# Patient Record
Sex: MALE | Race: BLACK OR AFRICAN AMERICAN | NOT HISPANIC OR LATINO | URBAN - METROPOLITAN AREA
[De-identification: names, ages, dates, MRNs, and addresses within clinical notes are randomized per-mention and may not be internally consistent; named-entity substitution may affect disease eponyms.]

---

## 2020-01-20 ENCOUNTER — INPATIENT (INPATIENT)
Facility: HOSPITAL | Age: 60
LOS: 1 days | Discharge: HOME | End: 2020-01-22
Attending: INTERNAL MEDICINE | Admitting: INTERNAL MEDICINE
Payer: MEDICAID

## 2020-01-20 VITALS
OXYGEN SATURATION: 98 % | TEMPERATURE: 98 F | SYSTOLIC BLOOD PRESSURE: 151 MMHG | DIASTOLIC BLOOD PRESSURE: 80 MMHG | HEART RATE: 54 BPM | RESPIRATION RATE: 17 BRPM

## 2020-01-20 DIAGNOSIS — I24.9 ACUTE ISCHEMIC HEART DISEASE, UNSPECIFIED: ICD-10-CM

## 2020-01-20 DIAGNOSIS — R09.89 OTHER SPECIFIED SYMPTOMS AND SIGNS INVOLVING THE CIRCULATORY AND RESPIRATORY SYSTEMS: ICD-10-CM

## 2020-01-20 DIAGNOSIS — I20.8 OTHER FORMS OF ANGINA PECTORIS: ICD-10-CM

## 2020-01-20 DIAGNOSIS — I10 ESSENTIAL (PRIMARY) HYPERTENSION: ICD-10-CM

## 2020-01-20 DIAGNOSIS — R00.1 BRADYCARDIA, UNSPECIFIED: ICD-10-CM

## 2020-01-20 DIAGNOSIS — Z90.49 ACQUIRED ABSENCE OF OTHER SPECIFIED PARTS OF DIGESTIVE TRACT: Chronic | ICD-10-CM

## 2020-01-20 DIAGNOSIS — M79.604 PAIN IN RIGHT LEG: ICD-10-CM

## 2020-01-20 DIAGNOSIS — Z29.9 ENCOUNTER FOR PROPHYLACTIC MEASURES, UNSPECIFIED: ICD-10-CM

## 2020-01-20 LAB
ALBUMIN SERPL ELPH-MCNC: 4.7 G/DL — SIGNIFICANT CHANGE UP (ref 3.3–5)
ALP SERPL-CCNC: 57 U/L — SIGNIFICANT CHANGE UP (ref 40–120)
ALT FLD-CCNC: 29 U/L — SIGNIFICANT CHANGE UP (ref 4–41)
AMPHET UR-MCNC: NEGATIVE — SIGNIFICANT CHANGE UP
ANION GAP SERPL CALC-SCNC: 13 MMO/L — SIGNIFICANT CHANGE UP (ref 7–14)
APAP SERPL-MCNC: < 15 UG/ML — LOW (ref 15–25)
APTT BLD: 28.1 SEC — SIGNIFICANT CHANGE UP (ref 27.5–36.3)
AST SERPL-CCNC: 19 U/L — SIGNIFICANT CHANGE UP (ref 4–40)
BARBITURATES UR SCN-MCNC: NEGATIVE — SIGNIFICANT CHANGE UP
BASOPHILS # BLD AUTO: 0.06 K/UL — SIGNIFICANT CHANGE UP (ref 0–0.2)
BASOPHILS NFR BLD AUTO: 1 % — SIGNIFICANT CHANGE UP (ref 0–2)
BENZODIAZ UR-MCNC: NEGATIVE — SIGNIFICANT CHANGE UP
BILIRUB SERPL-MCNC: 0.8 MG/DL — SIGNIFICANT CHANGE UP (ref 0.2–1.2)
BUN SERPL-MCNC: 11 MG/DL — SIGNIFICANT CHANGE UP (ref 7–23)
CALCIUM SERPL-MCNC: 9.2 MG/DL — SIGNIFICANT CHANGE UP (ref 8.4–10.5)
CANNABINOIDS UR-MCNC: NEGATIVE — SIGNIFICANT CHANGE UP
CHLORIDE SERPL-SCNC: 103 MMOL/L — SIGNIFICANT CHANGE UP (ref 98–107)
CK MB BLD-MCNC: 2.17 NG/ML — SIGNIFICANT CHANGE UP (ref 1–6.6)
CK SERPL-CCNC: 129 U/L — SIGNIFICANT CHANGE UP (ref 30–200)
CO2 SERPL-SCNC: 23 MMOL/L — SIGNIFICANT CHANGE UP (ref 22–31)
COCAINE METAB.OTHER UR-MCNC: NEGATIVE — SIGNIFICANT CHANGE UP
CREAT SERPL-MCNC: 1.05 MG/DL — SIGNIFICANT CHANGE UP (ref 0.5–1.3)
EOSINOPHIL # BLD AUTO: 0.14 K/UL — SIGNIFICANT CHANGE UP (ref 0–0.5)
EOSINOPHIL NFR BLD AUTO: 2.4 % — SIGNIFICANT CHANGE UP (ref 0–6)
ETHANOL BLD-MCNC: < 10 MG/DL — SIGNIFICANT CHANGE UP
GLUCOSE SERPL-MCNC: 99 MG/DL — SIGNIFICANT CHANGE UP (ref 70–99)
HCT VFR BLD CALC: 43.8 % — SIGNIFICANT CHANGE UP (ref 39–50)
HGB BLD-MCNC: 14.7 G/DL — SIGNIFICANT CHANGE UP (ref 13–17)
IMM GRANULOCYTES NFR BLD AUTO: 0.2 % — SIGNIFICANT CHANGE UP (ref 0–1.5)
INR BLD: 1.04 — SIGNIFICANT CHANGE UP (ref 0.88–1.17)
LYMPHOCYTES # BLD AUTO: 2.35 K/UL — SIGNIFICANT CHANGE UP (ref 1–3.3)
LYMPHOCYTES # BLD AUTO: 40.6 % — SIGNIFICANT CHANGE UP (ref 13–44)
MCHC RBC-ENTMCNC: 28.7 PG — SIGNIFICANT CHANGE UP (ref 27–34)
MCHC RBC-ENTMCNC: 33.6 % — SIGNIFICANT CHANGE UP (ref 32–36)
MCV RBC AUTO: 85.5 FL — SIGNIFICANT CHANGE UP (ref 80–100)
METHADONE UR-MCNC: NEGATIVE — SIGNIFICANT CHANGE UP
MONOCYTES # BLD AUTO: 0.83 K/UL — SIGNIFICANT CHANGE UP (ref 0–0.9)
MONOCYTES NFR BLD AUTO: 14.3 % — HIGH (ref 2–14)
NEUTROPHILS # BLD AUTO: 2.4 K/UL — SIGNIFICANT CHANGE UP (ref 1.8–7.4)
NEUTROPHILS NFR BLD AUTO: 41.5 % — LOW (ref 43–77)
NRBC # FLD: 0 K/UL — SIGNIFICANT CHANGE UP (ref 0–0)
OPIATES UR-MCNC: NEGATIVE — SIGNIFICANT CHANGE UP
OXYCODONE UR-MCNC: NEGATIVE — SIGNIFICANT CHANGE UP
PCP UR-MCNC: NEGATIVE — SIGNIFICANT CHANGE UP
PLATELET # BLD AUTO: 252 K/UL — SIGNIFICANT CHANGE UP (ref 150–400)
PMV BLD: 11 FL — SIGNIFICANT CHANGE UP (ref 7–13)
POTASSIUM SERPL-MCNC: 4.2 MMOL/L — SIGNIFICANT CHANGE UP (ref 3.5–5.3)
POTASSIUM SERPL-SCNC: 4.2 MMOL/L — SIGNIFICANT CHANGE UP (ref 3.5–5.3)
PROT SERPL-MCNC: 7.5 G/DL — SIGNIFICANT CHANGE UP (ref 6–8.3)
PROTHROM AB SERPL-ACNC: 11.6 SEC — SIGNIFICANT CHANGE UP (ref 9.8–13.1)
RBC # BLD: 5.12 M/UL — SIGNIFICANT CHANGE UP (ref 4.2–5.8)
RBC # FLD: 11.9 % — SIGNIFICANT CHANGE UP (ref 10.3–14.5)
SALICYLATES SERPL-MCNC: < 5 MG/DL — LOW (ref 15–30)
SODIUM SERPL-SCNC: 139 MMOL/L — SIGNIFICANT CHANGE UP (ref 135–145)
TROPONIN T, HIGH SENSITIVITY: 11 NG/L — SIGNIFICANT CHANGE UP (ref ?–14)
TROPONIN T, HIGH SENSITIVITY: 11 NG/L — SIGNIFICANT CHANGE UP (ref ?–14)
TROPONIN T, HIGH SENSITIVITY: 15 NG/L — SIGNIFICANT CHANGE UP (ref ?–14)
WBC # BLD: 5.79 K/UL — SIGNIFICANT CHANGE UP (ref 3.8–10.5)
WBC # FLD AUTO: 5.79 K/UL — SIGNIFICANT CHANGE UP (ref 3.8–10.5)

## 2020-01-20 PROCEDURE — 93971 EXTREMITY STUDY: CPT | Mod: 26,LT

## 2020-01-20 PROCEDURE — 71046 X-RAY EXAM CHEST 2 VIEWS: CPT | Mod: 26

## 2020-01-20 RX ORDER — ATORVASTATIN CALCIUM 80 MG/1
80 TABLET, FILM COATED ORAL AT BEDTIME
Refills: 0 | Status: DISCONTINUED | OUTPATIENT
Start: 2020-01-20 | End: 2020-01-22

## 2020-01-20 RX ORDER — INFLUENZA VIRUS VACCINE 15; 15; 15; 15 UG/.5ML; UG/.5ML; UG/.5ML; UG/.5ML
0.5 SUSPENSION INTRAMUSCULAR ONCE
Refills: 0 | Status: DISCONTINUED | OUTPATIENT
Start: 2020-01-20 | End: 2020-01-22

## 2020-01-20 RX ORDER — HEPARIN SODIUM 5000 [USP'U]/ML
5000 INJECTION INTRAVENOUS; SUBCUTANEOUS EVERY 12 HOURS
Refills: 0 | Status: DISCONTINUED | OUTPATIENT
Start: 2020-01-20 | End: 2020-01-22

## 2020-01-20 RX ORDER — TICAGRELOR 90 MG/1
180 TABLET ORAL ONCE
Refills: 0 | Status: COMPLETED | OUTPATIENT
Start: 2020-01-20 | End: 2020-01-20

## 2020-01-20 RX ORDER — AMLODIPINE BESYLATE 2.5 MG/1
1 TABLET ORAL
Qty: 0 | Refills: 0 | DISCHARGE

## 2020-01-20 RX ORDER — AMLODIPINE BESYLATE 2.5 MG/1
5 TABLET ORAL DAILY
Refills: 0 | Status: DISCONTINUED | OUTPATIENT
Start: 2020-01-21 | End: 2020-01-22

## 2020-01-20 RX ORDER — NITROGLYCERIN 6.5 MG
0.4 CAPSULE, EXTENDED RELEASE ORAL ONCE
Refills: 0 | Status: COMPLETED | OUTPATIENT
Start: 2020-01-20 | End: 2020-01-20

## 2020-01-20 RX ORDER — ASPIRIN/CALCIUM CARB/MAGNESIUM 324 MG
81 TABLET ORAL DAILY
Refills: 0 | Status: DISCONTINUED | OUTPATIENT
Start: 2020-01-20 | End: 2020-01-22

## 2020-01-20 RX ADMIN — HEPARIN SODIUM 5000 UNIT(S): 5000 INJECTION INTRAVENOUS; SUBCUTANEOUS at 19:30

## 2020-01-20 RX ADMIN — TICAGRELOR 180 MILLIGRAM(S): 90 TABLET ORAL at 21:10

## 2020-01-20 RX ADMIN — ATORVASTATIN CALCIUM 80 MILLIGRAM(S): 80 TABLET, FILM COATED ORAL at 22:45

## 2020-01-20 RX ADMIN — Medication 0.4 MILLIGRAM(S): at 12:48

## 2020-01-20 NOTE — H&P ADULT - NEGATIVE MUSCULOSKELETAL SYMPTOMS
no stiffness/no arthralgia/no arthritis/no joint swelling/no myalgia/no muscle cramps/no muscle weakness

## 2020-01-20 NOTE — H&P ADULT - NEGATIVE OPHTHALMOLOGIC SYMPTOMS
no blurred vision R/no discharge R/no blurred vision L/no discharge L/no diplopia/no photophobia/no lacrimation L/no lacrimation R

## 2020-01-20 NOTE — H&P ADULT - NEGATIVE NEUROLOGICAL SYMPTOMS
no transient paralysis/no syncope/no weakness/no vertigo/no generalized seizures/no loss of consciousness/no tremors/no paresthesias/no hemiparesis/no focal seizures/no loss of sensation/no difficulty walking/no headache/no confusion

## 2020-01-20 NOTE — H&P ADULT - PROBLEM SELECTOR PLAN 1
Patient s/p 3 Aspirin prior to coming to the ER and then received Brilinta 180mg and nitroglycerine. Patient currently chest pain free and EKG revealed sinus bradycardia at a rate of 44 with LAD, TWI V5-V6, biphasic in II, AVF   Will monitor on telemetry, serial EKG and Mervin prn for any more episodes of chest pain   HgbA1C, TSH, lipid profile, CBC, CMP, D-dimer ordered   TTE ordered  Will make NPO after midnight for LHC in am 1/21   Started on Aspirin 81mg, Lipitor 80mg. Held B-Blocker due to bradycardia

## 2020-01-20 NOTE — H&P ADULT - NEGATIVE GASTROINTESTINAL SYMPTOMS
no constipation/no hematochezia/no melena/no diarrhea/no nausea/no vomiting/no change in bowel habits/no abdominal pain

## 2020-01-20 NOTE — H&P ADULT - NSHPSOCIALHISTORY_GEN_ALL_CORE
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

## 2020-01-20 NOTE — ED PROVIDER NOTE - ATTENDING CONTRIBUTION TO CARE
Pt was seen and evaluated by me. Pt is a 60 y/o male with PMHx of HTN who presented to the ED for intermittent chest pain X 4 days. Pt states over the past 4 days having intermittent chest pain with associated SOB with minimal exertion that lasts a few minutes then resolved. Pt also notes back pain with movement, relieved with rest. Lungs CTA b/l. RRR. Abd soft, non-tender. No palpable bruits. + distal pulses b/l.

## 2020-01-20 NOTE — ED PROVIDER NOTE - PHYSICAL EXAMINATION
General: NAD, good hygiene, well developed  HENT: Atraumatic, EOMI, no conjunctivae injection, moist mucosa  Neck: normal ROM and trachea midline   Cardiovascular: RRR, S1&2, no M or R, radial pulses equal and b/l  Respiratory: CTABL, no wheezes or crackles, no decreased breath sounds  Abdominal: soft and non-tender non distended, neg for guarding, torres's sign, rovsing's sign, mcburney's sign, no CVA tenderness   Extremities: no edema of the legs/feet, DP/PT equal b/l  Skin: warm, well perfused  Neurologic: nonfocal, AAOx3  Psych: normal mood and affect

## 2020-01-20 NOTE — ED ADULT TRIAGE NOTE - NSTRIAGECARE_GEN_A_ER
Quality 226: Preventive Care And Screening: Tobacco Use: Screening And Cessation Intervention: Patient screened for tobacco use and is an ex/non-smoker
Detail Level: Zone
EKG
Quality 431: Preventive Care And Screening: Unhealthy Alcohol Use - Screening: Patient screened for unhealthy alcohol use using a single question and scores less than 2 times per year

## 2020-01-20 NOTE — H&P ADULT - NEGATIVE ENMT SYMPTOMS
no tinnitus/no hearing difficulty/no sinus symptoms/no nasal discharge/no vertigo/no nasal congestion/no ear pain

## 2020-01-20 NOTE — H&P ADULT - ASSESSMENT
58 y/o M with PMH of HTN presented with the complaint of left sided chest pain. R/o ACS    +Chest pain-S/p Brilinta load and NPO for LHC in am

## 2020-01-20 NOTE — H&P ADULT - PROBLEM SELECTOR PLAN 2
Patient noted to be bradycardic on telemonitoring and EKG showed sinus bradycardia/marked bradycardia  Will monitor on telemetry for now   TTE ordered   TSH level ordered   Will hold all AVN blockers for now

## 2020-01-20 NOTE — H&P ADULT - NSICDXFAMILYHX_GEN_ALL_CORE_FT
FAMILY HISTORY:  Family history of bone cancer, Mother  Family history of hypertension, Father  FH: brain aneurysm, Sister

## 2020-01-20 NOTE — ED PROVIDER NOTE - NS ED ROS FT
GENERAL: No fever or chills, weight changes, nightsweats  EYES: no change in vision  HEENT: no dysplasia, odynophagia, ear pain, rhinorrhea, epistasis   CARDIAC: HPI    PULMONARY: cough no SOB  GI: no abdominal pain, no nausea or no vomiting, no diarrhea or constipation  : No changes in urination for pain/freq.   SKIN: no rashes, abnormal bruising or bleeding  NEURO: no headache, numbness/tingling, extremity weakness   MSK: No joint pain GENERAL: No fever or chills, weight changes, nightsweats  EYES: no change in vision  HEENT: no dysplasia, odynophagia, ear pain, rhinorrhea, epistasis   CARDIAC: + chest pain  PULMONARY: cough no SOB  GI: no abdominal pain, no nausea or no vomiting, no diarrhea or constipation  : No changes in urination for pain/freq.   SKIN: no rashes, abnormal bruising or bleeding  NEURO: no headache, numbness/tingling, extremity weakness   MSK: No joint pain

## 2020-01-20 NOTE — ED ADULT NURSE NOTE - OBJECTIVE STATEMENT
Pt arrives to spot 28 --pt c/o chest discomfort--7/10. Pt given NTG sl with decrease of pain to 2/10. PT REMAINS ON CARDIAC MONITOR IN BRADYCARDIC RHYTUM 42-46. Pt in good spirits no signs of distress noted. Pts skin clean dry any intact. Pt to be admitted for cardiac workup

## 2020-01-20 NOTE — ED PROVIDER NOTE - OBJECTIVE STATEMENT
59M, h.o HTN, p.w intermittent chest pain at rest for 4 days. pain is sharp and radiating to the back and has mild leftsided neck pain. Pain is worse with movement. Had similar pain last year and had a stress test that was neg per patient. Pt denied any n/v/abd pain, shortness of breath. 59M, h.o HTN, p.w intermittent chest pain at rest for 4 days. pain is sharp and radiating to the back and has mild left sided neck pain. Pain is worse with movement. Had similar pain last year and had a stress test that was neg per patient. Pt denied any n/v/abd pain, shortness of breath. 59M, h.o HTN, p.w intermittent chest pain at rest for 4 days. pain is sharp and radiating left sided neck pain. Pain is worse with movement with pain to back with movement. Had similar pain last year and had a stress test that was neg per patient. Pt denied any n/v/abd pain, shortness of breath.

## 2020-01-20 NOTE — ED PROVIDER NOTE - MUSCULOSKELETAL, MLM
Spine appears normal, range of motion is not limited, no muscle or joint tenderness. + distal pulses b/l.

## 2020-01-20 NOTE — ED PROVIDER NOTE - CLINICAL SUMMARY MEDICAL DECISION MAKING FREE TEXT BOX
59M, htn, p.w intermittent chest pain radiating to the back w. n/v. recent neg stress test. patient denied any n/v. radial pulses equal and b/l, not diaphoretic or NAD. concerning for acs, no abdominal pain. no sign of ICH, bp is mildly elevated. besides chest pain radiating to the back no other sign of dissection

## 2020-01-20 NOTE — H&P ADULT - NSHPLABSRESULTS_GEN_ALL_CORE
14.7   5.79  )-----------( 252      ( 20 Jan 2020 12:00 )             43.8     01-20    139  |  103  |  11  ----------------------------<  99  4.2   |  23  |  1.05    Ca    9.2      20 Jan 2020 12:00    TPro  7.5  /  Alb  4.7  /  TBili  0.8  /  DBili  x   /  AST  19  /  ALT  29  /  AlkPhos  57  01-20    CXR: Clear lungs. No pleural effusions or pneumothorax. Cardiac and mediastinal silhouettes within normal limits. Trachea midline. Unremarkable osseous structures.    EKG: Sinus bradycardia at a rate of 44 with LAD, TWI V5-V6, biphasic in II, AVF with QTc of 396

## 2020-01-20 NOTE — ED PROVIDER NOTE - PROGRESS NOTE DETAILS
Ben Calixto, PGY 2: currently chest pain free, patient states he forgot to report he has right groin and leg pain, will add dvt study and admit to tele doc of the day. Ben Calixto, PGY 2: HR went down to 40s with pvc's. no chest pain, patient states his normal HR is in 50s.

## 2020-01-20 NOTE — H&P ADULT - HISTORY OF PRESENT ILLNESS
58 y/o M with PMH of HTN presented with the complaint of left sided chest pain. As per the patient he came from [place] this morning and has been having left sided chest pain since Friday. Patient stated that he had right sided chest pain in June and had a stress test done that time and was told "it was normal". Patient stated that he had finished praying on Friday and then sat down to eat lunch and developed left sided chest pain. Patient stated that the chest pain is intermittent, characterized as "something sitting on his chest", without any aggravating factors, self alleviating, with radiation of the chest pain to the left upper back(scapular region), with a severity of 6/10. Patient stated that he normally takes Aspirin daily and took it that day with very mild improvement of the chest pain. Patient stated that he went to a hospital on Saturday in [facility] and was treated for GERD. Patient stated that he drank some water with lemon thinking it was GERD and it helped initially only for the chest pain to return again. When patient came to the [place] this morning he developed chest pain again so he called EMS and was given 2 baby aspirin and brought to the ER. Patient denied any SOB, fevers, chills, N/V/D/C, abdominal pain, dysuria, melena, hematochezia, sick contact, pleuritic or positional chest pain. Of note patient also endorsed of right midthigh pain below the groin that started 1 month ago. Patient stated that when he bends down the right midthigh pain would get worse and relief when he would sit straight, he denied any trauma to the right thigh.     On ED admission EKG revealed Sinus bradycardia at a rate of 44 with LAD, TWI V5-V6, biphasic in II, AVF with QTc of 396, CE x 1: Trop: 15, CE x 2: Trop: 11. CXR: Clear lungs. No pleural effusions or pneumothorax. Cardiac and mediastinal silhouettes within normal limits. Trachea midline. Unremarkable osseous structures. In the ED patient received Nitroglycerine sublingual and Brilinta load. When examined patient is resting in the stretcher and denied any current chest pain or SOB.

## 2020-01-20 NOTE — H&P ADULT - RS GEN PE MLT RESP DETAILS PC
clear to auscultation bilaterally/normal/airway patent/breath sounds equal/good air movement/no rales/respirations non-labored/no intercostal retractions/no chest wall tenderness/no rhonchi/no wheezes

## 2020-01-20 NOTE — H&P ADULT - GASTROINTESTINAL DETAILS
normal/bowel sounds normal/no distention/soft/nontender/no rigidity/no guarding/no rebound tenderness

## 2020-01-20 NOTE — H&P ADULT - NEGATIVE CARDIOVASCULAR SYMPTOMS
no peripheral edema/no palpitations/no orthopnea/no paroxysmal nocturnal dyspnea/no dyspnea on exertion

## 2020-01-21 LAB
ANION GAP SERPL CALC-SCNC: 12 MMO/L — SIGNIFICANT CHANGE UP (ref 7–14)
BUN SERPL-MCNC: 11 MG/DL — SIGNIFICANT CHANGE UP (ref 7–23)
CALCIUM SERPL-MCNC: 9.4 MG/DL — SIGNIFICANT CHANGE UP (ref 8.4–10.5)
CHLORIDE SERPL-SCNC: 104 MMOL/L — SIGNIFICANT CHANGE UP (ref 98–107)
CHOLEST SERPL-MCNC: 118 MG/DL — LOW (ref 120–199)
CO2 SERPL-SCNC: 23 MMOL/L — SIGNIFICANT CHANGE UP (ref 22–31)
CREAT SERPL-MCNC: 0.89 MG/DL — SIGNIFICANT CHANGE UP (ref 0.5–1.3)
D DIMER BLD IA.RAPID-MCNC: < 150 NG/ML — SIGNIFICANT CHANGE UP
GLUCOSE BLDC GLUCOMTR-MCNC: 90 MG/DL — SIGNIFICANT CHANGE UP (ref 70–99)
GLUCOSE SERPL-MCNC: 99 MG/DL — SIGNIFICANT CHANGE UP (ref 70–99)
HBA1C BLD-MCNC: 5.7 % — HIGH (ref 4–5.6)
HCT VFR BLD CALC: 44.2 % — SIGNIFICANT CHANGE UP (ref 39–50)
HCV AB S/CO SERPL IA: 0.12 S/CO — SIGNIFICANT CHANGE UP (ref 0–0.99)
HCV AB SERPL-IMP: SIGNIFICANT CHANGE UP
HDLC SERPL-MCNC: 43 MG/DL — SIGNIFICANT CHANGE UP (ref 35–55)
HGB BLD-MCNC: 15.3 G/DL — SIGNIFICANT CHANGE UP (ref 13–17)
LIPID PNL WITH DIRECT LDL SERPL: 65 MG/DL — SIGNIFICANT CHANGE UP
MAGNESIUM SERPL-MCNC: 2 MG/DL — SIGNIFICANT CHANGE UP (ref 1.6–2.6)
MCHC RBC-ENTMCNC: 29 PG — SIGNIFICANT CHANGE UP (ref 27–34)
MCHC RBC-ENTMCNC: 34.6 % — SIGNIFICANT CHANGE UP (ref 32–36)
MCV RBC AUTO: 83.9 FL — SIGNIFICANT CHANGE UP (ref 80–100)
NRBC # FLD: 0 K/UL — SIGNIFICANT CHANGE UP (ref 0–0)
PHOSPHATE SERPL-MCNC: 3.7 MG/DL — SIGNIFICANT CHANGE UP (ref 2.5–4.5)
PLATELET # BLD AUTO: 255 K/UL — SIGNIFICANT CHANGE UP (ref 150–400)
PMV BLD: 10.7 FL — SIGNIFICANT CHANGE UP (ref 7–13)
POTASSIUM SERPL-MCNC: 4 MMOL/L — SIGNIFICANT CHANGE UP (ref 3.5–5.3)
POTASSIUM SERPL-SCNC: 4 MMOL/L — SIGNIFICANT CHANGE UP (ref 3.5–5.3)
RBC # BLD: 5.27 M/UL — SIGNIFICANT CHANGE UP (ref 4.2–5.8)
RBC # FLD: 11.9 % — SIGNIFICANT CHANGE UP (ref 10.3–14.5)
SODIUM SERPL-SCNC: 139 MMOL/L — SIGNIFICANT CHANGE UP (ref 135–145)
TRIGL SERPL-MCNC: 84 MG/DL — SIGNIFICANT CHANGE UP (ref 10–149)
TSH SERPL-MCNC: 0.51 UIU/ML — SIGNIFICANT CHANGE UP (ref 0.27–4.2)
WBC # BLD: 5.66 K/UL — SIGNIFICANT CHANGE UP (ref 3.8–10.5)
WBC # FLD AUTO: 5.66 K/UL — SIGNIFICANT CHANGE UP (ref 3.8–10.5)

## 2020-01-21 PROCEDURE — 93454 CORONARY ARTERY ANGIO S&I: CPT | Mod: 26

## 2020-01-21 RX ORDER — ATROPINE SULFATE 0.1 MG/ML
0.5 SYRINGE (ML) INJECTION ONCE
Refills: 0 | Status: DISCONTINUED | OUTPATIENT
Start: 2020-01-21 | End: 2020-01-22

## 2020-01-21 RX ORDER — ACETAMINOPHEN 500 MG
1000 TABLET ORAL ONCE
Refills: 0 | Status: COMPLETED | OUTPATIENT
Start: 2020-01-21 | End: 2020-01-21

## 2020-01-21 RX ADMIN — AMLODIPINE BESYLATE 5 MILLIGRAM(S): 2.5 TABLET ORAL at 05:26

## 2020-01-21 RX ADMIN — ATORVASTATIN CALCIUM 80 MILLIGRAM(S): 80 TABLET, FILM COATED ORAL at 21:24

## 2020-01-21 RX ADMIN — Medication 1000 MILLIGRAM(S): at 11:55

## 2020-01-21 RX ADMIN — HEPARIN SODIUM 5000 UNIT(S): 5000 INJECTION INTRAVENOUS; SUBCUTANEOUS at 05:23

## 2020-01-21 RX ADMIN — Medication 400 MILLIGRAM(S): at 11:35

## 2020-01-21 RX ADMIN — Medication 81 MILLIGRAM(S): at 12:16

## 2020-01-21 NOTE — PROGRESS NOTE ADULT - SUBJECTIVE AND OBJECTIVE BOX
Patient is a 59y old  Male who presents with a chief complaint of Left sided chest pain (20 Jan 2020 20:50)      INTERVAL HISTORY:     TELEMETRY:  	  MEDICATIONS:  amLODIPine   Tablet 5 milliGRAM(s) Oral daily        PHYSICAL EXAM:  T(C): 36.6 (01-21-20 @ 13:03), Max: 36.6 (01-21-20 @ 13:03)  HR: 44 (01-21-20 @ 13:03) (44 - 49)  BP: 138/70 (01-21-20 @ 13:03) (113/62 - 148/78)  RR: 16 (01-21-20 @ 13:03) (13 - 17)  SpO2: 100% (01-21-20 @ 13:03) (100% - 100%)  Wt(kg): --  I&O's Summary        Appearance: In no distress	  HEENT:    PERRL, EOMI	  Cardiovascular:  S1 S2, No JVD  Respiratory: Lungs clear to auscultation	  Gastrointestinal:  Soft, Non-tender, + BS	  Extremities:  No edema of LE                                15.3   5.66  )-----------( 255      ( 21 Jan 2020 05:20 )             44.2     01-21    139  |  104  |  11  ----------------------------<  99  4.0   |  23  |  0.89    Ca    9.4      21 Jan 2020 09:05  Phos  3.7     01-21  Mg     2.0     01-21    TPro  7.5  /  Alb  4.7  /  TBili  0.8  /  DBili  x   /  AST  19  /  ALT  29  /  AlkPhos  57  01-20        Labs personally reviewed      ASSESSMENT/PLAN: 	  myocardial bridge and otherwise no CAD on cath  Norvasc 2.5mg  outpt follow up         Juan Daniel Reddy DO [facility]  Cardiovascular Medicine  [address], [address]  Office: [phone]  Cell: [phone]

## 2020-01-22 VITALS
DIASTOLIC BLOOD PRESSURE: 72 MMHG | HEART RATE: 56 BPM | OXYGEN SATURATION: 99 % | RESPIRATION RATE: 17 BRPM | SYSTOLIC BLOOD PRESSURE: 122 MMHG | TEMPERATURE: 98 F

## 2020-01-22 LAB
ANION GAP SERPL CALC-SCNC: 10 MMO/L — SIGNIFICANT CHANGE UP (ref 7–14)
BUN SERPL-MCNC: 13 MG/DL — SIGNIFICANT CHANGE UP (ref 7–23)
CALCIUM SERPL-MCNC: 9.2 MG/DL — SIGNIFICANT CHANGE UP (ref 8.4–10.5)
CHLORIDE SERPL-SCNC: 105 MMOL/L — SIGNIFICANT CHANGE UP (ref 98–107)
CO2 SERPL-SCNC: 25 MMOL/L — SIGNIFICANT CHANGE UP (ref 22–31)
CREAT SERPL-MCNC: 1.04 MG/DL — SIGNIFICANT CHANGE UP (ref 0.5–1.3)
GLUCOSE SERPL-MCNC: 101 MG/DL — HIGH (ref 70–99)
HCT VFR BLD CALC: 44.5 % — SIGNIFICANT CHANGE UP (ref 39–50)
HGB BLD-MCNC: 15.3 G/DL — SIGNIFICANT CHANGE UP (ref 13–17)
MAGNESIUM SERPL-MCNC: 2.2 MG/DL — SIGNIFICANT CHANGE UP (ref 1.6–2.6)
MCHC RBC-ENTMCNC: 29.2 PG — SIGNIFICANT CHANGE UP (ref 27–34)
MCHC RBC-ENTMCNC: 34.4 % — SIGNIFICANT CHANGE UP (ref 32–36)
MCV RBC AUTO: 84.9 FL — SIGNIFICANT CHANGE UP (ref 80–100)
NRBC # FLD: 0 K/UL — SIGNIFICANT CHANGE UP (ref 0–0)
PHOSPHATE SERPL-MCNC: 4.8 MG/DL — HIGH (ref 2.5–4.5)
PLATELET # BLD AUTO: 275 K/UL — SIGNIFICANT CHANGE UP (ref 150–400)
PMV BLD: 11.2 FL — SIGNIFICANT CHANGE UP (ref 7–13)
POTASSIUM SERPL-MCNC: 4.8 MMOL/L — SIGNIFICANT CHANGE UP (ref 3.5–5.3)
POTASSIUM SERPL-SCNC: 4.8 MMOL/L — SIGNIFICANT CHANGE UP (ref 3.5–5.3)
RBC # BLD: 5.24 M/UL — SIGNIFICANT CHANGE UP (ref 4.2–5.8)
RBC # FLD: 11.9 % — SIGNIFICANT CHANGE UP (ref 10.3–14.5)
SODIUM SERPL-SCNC: 140 MMOL/L — SIGNIFICANT CHANGE UP (ref 135–145)
WBC # BLD: 6.73 K/UL — SIGNIFICANT CHANGE UP (ref 3.8–10.5)
WBC # FLD AUTO: 6.73 K/UL — SIGNIFICANT CHANGE UP (ref 3.8–10.5)

## 2020-01-22 RX ORDER — ASPIRIN/CALCIUM CARB/MAGNESIUM 324 MG
1 TABLET ORAL
Qty: 0 | Refills: 0 | DISCHARGE

## 2020-01-22 RX ORDER — ATORVASTATIN CALCIUM 80 MG/1
1 TABLET, FILM COATED ORAL
Qty: 30 | Refills: 0
Start: 2020-01-22 | End: 2020-02-20

## 2020-01-22 RX ORDER — ATORVASTATIN CALCIUM 80 MG/1
1 TABLET, FILM COATED ORAL
Qty: 0 | Refills: 0 | DISCHARGE

## 2020-01-22 RX ORDER — ASPIRIN/CALCIUM CARB/MAGNESIUM 324 MG
1 TABLET ORAL
Qty: 30 | Refills: 0
Start: 2020-01-22 | End: 2020-02-20

## 2020-01-22 RX ADMIN — Medication 81 MILLIGRAM(S): at 11:29

## 2020-01-22 RX ADMIN — HEPARIN SODIUM 5000 UNIT(S): 5000 INJECTION INTRAVENOUS; SUBCUTANEOUS at 06:26

## 2020-01-22 RX ADMIN — AMLODIPINE BESYLATE 5 MILLIGRAM(S): 2.5 TABLET ORAL at 06:26

## 2020-01-22 NOTE — DISCHARGE NOTE PROVIDER - NSDCMRMEDTOKEN_GEN_ALL_CORE_FT
amLODIPine 5 mg oral tablet: 1 tab(s) orally once a day  Aspirin Enteric Coated 81 mg oral delayed release tablet: 1 tab(s) orally once a day  atorvastatin 80 mg oral tablet: 1 tab(s) orally once a day (at bedtime)

## 2020-01-22 NOTE — DISCHARGE NOTE NURSING/CASE MANAGEMENT/SOCIAL WORK - NSDCPEEMAIL_GEN_ALL_CORE
[facility] for Tobacco Control email [email or web address]

## 2020-01-22 NOTE — DISCHARGE NOTE NURSING/CASE MANAGEMENT/SOCIAL WORK - NSDCPEWEB_GEN_ALL_CORE
[facility] for Tobacco Control website --- [email or web address] --- [email or web address]

## 2020-01-22 NOTE — DISCHARGE NOTE NURSING/CASE MANAGEMENT/SOCIAL WORK - PATIENT PORTAL LINK FT
You can access the FollowMyHealth Patient Portal offered by [facility] by registering at the following website: [email or web address]. By joining [employer]’s FollowMyHealth portal, you will also be able to view your health information using other applications (apps) compatible with our system.

## 2020-01-22 NOTE — DISCHARGE NOTE PROVIDER - CARE PROVIDER_API CALL
Juan Daniel Reddy (DO)  Cardiovascular Disease; Internal Medicine; Nuclear Cardiology  [address] [place] [address], [place] [address]  Phone: [phone]  Fax: [phone]  Follow Up Time:

## 2020-01-22 NOTE — CHART NOTE - NSCHARTNOTEFT_GEN_A_CORE
JEREMIAH GAN 59y Male s/p cath radial site check. Dressing is clear/dry/intact. Site is without hematoma or bleeding.   Patient denies pain, numbness, tingling, CP, SOB. VSS. Will continue to monitor.       Vital Signs Last 24 Hrs  T(C): 36.3 (21 Jan 2020 23:38), Max: 36.6 (21 Jan 2020 13:03)  T(F): 97.4 (21 Jan 2020 23:38), Max: 97.8 (21 Jan 2020 13:03)  HR: 46 (21 Jan 2020 23:38) (44 - 53)  BP: 135/68 (21 Jan 2020 23:38) (135/68 - 148/78)  RR: 17 (21 Jan 2020 23:38) (13 - 17)  SpO2: 100% (21 Jan 2020 23:38) (100% - 100%)  Pulses palpable, cap refill <2 sec.

## 2020-01-22 NOTE — DISCHARGE NOTE PROVIDER - HOSPITAL COURSE
58 y/o M with PMH of HTN presented with the complaint of left sided chest pain. and EKG revealed sinus bradycardia at a rate of 44 with LAD, TWI V5-V6, biphasic in II, AVF. Pt s/p cardiac cath: myocardial bridge, no coronary artery disease. Pt was monitored overnight and is now stable for discharge home.

## 2021-05-05 ENCOUNTER — EMERGENCY (EMERGENCY)
Facility: HOSPITAL | Age: 61
LOS: 1 days | Discharge: HOME | End: 2021-05-05
Attending: EMERGENCY MEDICINE | Admitting: EMERGENCY MEDICINE
Payer: MEDICAID

## 2021-05-05 VITALS
RESPIRATION RATE: 16 BRPM | DIASTOLIC BLOOD PRESSURE: 87 MMHG | SYSTOLIC BLOOD PRESSURE: 126 MMHG | TEMPERATURE: 98 F | HEART RATE: 46 BPM | OXYGEN SATURATION: 100 %

## 2021-05-05 VITALS
TEMPERATURE: 98 F | HEART RATE: 61 BPM | RESPIRATION RATE: 18 BRPM | SYSTOLIC BLOOD PRESSURE: 145 MMHG | OXYGEN SATURATION: 100 % | DIASTOLIC BLOOD PRESSURE: 96 MMHG

## 2021-05-05 DIAGNOSIS — Z90.49 ACQUIRED ABSENCE OF OTHER SPECIFIED PARTS OF DIGESTIVE TRACT: Chronic | ICD-10-CM

## 2021-05-05 PROBLEM — I10 ESSENTIAL (PRIMARY) HYPERTENSION: Chronic | Status: ACTIVE | Noted: 2020-01-20

## 2021-05-05 LAB
ALBUMIN SERPL ELPH-MCNC: 4.3 G/DL — SIGNIFICANT CHANGE UP (ref 3.3–5)
ALP SERPL-CCNC: 62 U/L — SIGNIFICANT CHANGE UP (ref 40–120)
ALT FLD-CCNC: 28 U/L — SIGNIFICANT CHANGE UP (ref 4–41)
ANION GAP SERPL CALC-SCNC: 9 MMOL/L — SIGNIFICANT CHANGE UP (ref 7–14)
AST SERPL-CCNC: 21 U/L — SIGNIFICANT CHANGE UP (ref 4–40)
BASOPHILS # BLD AUTO: 0.07 K/UL — SIGNIFICANT CHANGE UP (ref 0–0.2)
BASOPHILS NFR BLD AUTO: 1.3 % — SIGNIFICANT CHANGE UP (ref 0–2)
BILIRUB SERPL-MCNC: 0.4 MG/DL — SIGNIFICANT CHANGE UP (ref 0.2–1.2)
BUN SERPL-MCNC: 11 MG/DL — SIGNIFICANT CHANGE UP (ref 7–23)
CALCIUM SERPL-MCNC: 9.3 MG/DL — SIGNIFICANT CHANGE UP (ref 8.4–10.5)
CHLORIDE SERPL-SCNC: 102 MMOL/L — SIGNIFICANT CHANGE UP (ref 98–107)
CO2 SERPL-SCNC: 25 MMOL/L — SIGNIFICANT CHANGE UP (ref 22–31)
CREAT SERPL-MCNC: 1.09 MG/DL — SIGNIFICANT CHANGE UP (ref 0.5–1.3)
D DIMER BLD IA.RAPID-MCNC: <150 NG/ML DDU — SIGNIFICANT CHANGE UP
EOSINOPHIL # BLD AUTO: 0.12 K/UL — SIGNIFICANT CHANGE UP (ref 0–0.5)
EOSINOPHIL NFR BLD AUTO: 2.2 % — SIGNIFICANT CHANGE UP (ref 0–6)
GLUCOSE SERPL-MCNC: 117 MG/DL — HIGH (ref 70–99)
HCT VFR BLD CALC: 42.8 % — SIGNIFICANT CHANGE UP (ref 39–50)
HGB BLD-MCNC: 14.6 G/DL — SIGNIFICANT CHANGE UP (ref 13–17)
IANC: 2.41 K/UL — SIGNIFICANT CHANGE UP (ref 1.5–8.5)
IMM GRANULOCYTES NFR BLD AUTO: 0.2 % — SIGNIFICANT CHANGE UP (ref 0–1.5)
LYMPHOCYTES # BLD AUTO: 1.97 K/UL — SIGNIFICANT CHANGE UP (ref 1–3.3)
LYMPHOCYTES # BLD AUTO: 36.3 % — SIGNIFICANT CHANGE UP (ref 13–44)
MCHC RBC-ENTMCNC: 29.1 PG — SIGNIFICANT CHANGE UP (ref 27–34)
MCHC RBC-ENTMCNC: 34.1 GM/DL — SIGNIFICANT CHANGE UP (ref 32–36)
MCV RBC AUTO: 85.3 FL — SIGNIFICANT CHANGE UP (ref 80–100)
MONOCYTES # BLD AUTO: 0.85 K/UL — SIGNIFICANT CHANGE UP (ref 0–0.9)
MONOCYTES NFR BLD AUTO: 15.7 % — HIGH (ref 2–14)
NEUTROPHILS # BLD AUTO: 2.41 K/UL — SIGNIFICANT CHANGE UP (ref 1.8–7.4)
NEUTROPHILS NFR BLD AUTO: 44.3 % — SIGNIFICANT CHANGE UP (ref 43–77)
NRBC # BLD: 0 /100 WBCS — SIGNIFICANT CHANGE UP
NRBC # FLD: 0 K/UL — SIGNIFICANT CHANGE UP
PLATELET # BLD AUTO: 247 K/UL — SIGNIFICANT CHANGE UP (ref 150–400)
POTASSIUM SERPL-MCNC: 4.6 MMOL/L — SIGNIFICANT CHANGE UP (ref 3.5–5.3)
POTASSIUM SERPL-SCNC: 4.6 MMOL/L — SIGNIFICANT CHANGE UP (ref 3.5–5.3)
PROT SERPL-MCNC: 7.8 G/DL — SIGNIFICANT CHANGE UP (ref 6–8.3)
RBC # BLD: 5.02 M/UL — SIGNIFICANT CHANGE UP (ref 4.2–5.8)
RBC # FLD: 12.1 % — SIGNIFICANT CHANGE UP (ref 10.3–14.5)
SARS-COV-2 RNA SPEC QL NAA+PROBE: SIGNIFICANT CHANGE UP
SODIUM SERPL-SCNC: 136 MMOL/L — SIGNIFICANT CHANGE UP (ref 135–145)
TROPONIN T, HIGH SENSITIVITY RESULT: 10 NG/L — SIGNIFICANT CHANGE UP
TROPONIN T, HIGH SENSITIVITY RESULT: 10 NG/L — SIGNIFICANT CHANGE UP
WBC # BLD: 5.43 K/UL — SIGNIFICANT CHANGE UP (ref 3.8–10.5)
WBC # FLD AUTO: 5.43 K/UL — SIGNIFICANT CHANGE UP (ref 3.8–10.5)

## 2021-05-05 PROCEDURE — 71045 X-RAY EXAM CHEST 1 VIEW: CPT | Mod: 26

## 2021-05-05 PROCEDURE — 93010 ELECTROCARDIOGRAM REPORT: CPT

## 2021-05-05 PROCEDURE — 99285 EMERGENCY DEPT VISIT HI MDM: CPT | Mod: 25

## 2021-05-05 NOTE — ED PROVIDER NOTE - PATIENT PORTAL LINK FT
You can access the FollowMyHealth Patient Portal offered by [facility] by registering at the following website: [email or web address]. By joining [employer]’s FollowMyHealth portal, you will also be able to view your health information using other applications (apps) compatible with our system.

## 2021-05-05 NOTE — ED PROVIDER NOTE - OBJECTIVE STATEMENT
59 yo M with PMH of HTN, HLD presents for 2 days chest pain. Sharp, mid chest, occasionally radiates to back of left shoulder, worse laying on left side, better laying on right side, does not think it has changed with exertion although has not really exerted himself in past 2 days. Patient has been having similar pain intermittently for about 3 months.  Denies associated symptoms including SOB, cough/congestion, fever, n/v/d, urinary symptoms, skin changes, presyncope, palpitations, leg swelling/pain, black/bloody stool, focal weakness.  Patient came from [place] 5 days ago when he also ran out of clopidogrel and nitro. Triage note reports recent blood clot although pt clarifies that he had the first dose of the [place] covid vaccine in March and was worried he may have a blood clot bc of what he read. Declined second dose. Never told he had a blood clot by a doctor and thought the clopidogrel might treat blood clots.  Presented to [facility] Jan 2020 and had cath without CAD but did show myocardial bridge.  Taking ASA, atorvastatin and ACE inhibitor.  Remote smoking history. Denies drugs. 59 yo M with PMH of HTN, HLD presents for 2 days chest pain. Sharp, mid chest, occasionally radiates to back of left shoulder, worse laying on left side, better laying on right side, does not think it has changed with exertion although has not really exerted himself in past 2 days. Patient has been having similar pain intermittently for about 3 months.  Denies associated symptoms including SOB, cough/congestion, fever, n/v/d, urinary symptoms, skin changes, presyncope, palpitations, leg swelling/pain, black/bloody stool, focal weakness.  Patient came from [place] 5 days ago when he also ran out of clopidogrel and nitro. Triage note reports recent blood clot although pt clarifies that he had the first dose of the [place] covid vaccine in March and was worried he may have a blood clot bc of what he read. Declined second dose. Never told he had a blood clot by a doctor and thought the clopidogrel might treat blood clots.  Presented to [facility] Jan 2020 and had cath without CAD but did show myocardial bridge.  Taking ASA, atorvastatin and ACE inhibitor.  Remote smoking history. Denies drugs.    FALKOWSKA: Left side intermittent chest burning since having COVID in February 2021. Pain worse with moving arm or lifting head. Pt has been [place] in [facility], saw a cardiologist and gastroenterologist. Was prescribed a ntitro-type drug which improved his symptoms. Ran out on Friday. Cardiac catheterization January 2021 showed only mild disease.

## 2021-05-05 NOTE — ED ADULT NURSE NOTE - OBJECTIVE STATEMENT
Pt brought in by EMS, received to trauma room A. Pt A&Ox4, ambulatory at baseline. Pt states that he has been having 3 weeks of intermittent chest pain. Pt states that the pain was worse today before calling EMS. Pt states that he took a "[place] covid vaccine" in March and is worried about a possible blood clot. Respirations equal and unlabored, no acute distress noted. Abdomen soft, nondistended, nontender. Pt states that he was given 325mg aspirin and nitro with EMS and pt states he felt better after receiving those medications. 20g IV placed in left AC, labs drawn and sent as ordered. Cardiac monitor in place, sinus bradycardia noted. Vital signs as noted, comfort measures provided, call bell within reach. Assessment ongoing.

## 2021-05-05 NOTE — ED PROVIDER NOTE - PROGRESS NOTE DETAILS
MARILOU Lim- Pt feeling better after ER stay, chest pain resolved. Trop 10 x 2, d-dimer negative. I spoke with pts cardiologist, Dr. Reddy who is comfortable with dc and said that pt can f/u with him as an outpatient. Pt is stable for dc.

## 2021-05-05 NOTE — ED ADULT NURSE NOTE - NSIMPLEMENTINTERV_GEN_ALL_ED
Implemented All Universal Safety Interventions:  [place] to call system. Call bell, personal items and telephone within reach. Instruct patient to call for assistance. Room bathroom lighting operational. Non-slip footwear when patient is off stretcher. Physically safe environment: no spills, clutter or unnecessary equipment. Stretcher in lowest position, wheels locked, appropriate side rails in place.

## 2021-05-05 NOTE — ED ADULT NURSE REASSESSMENT NOTE - NS ED NURSE REASSESS COMMENT FT1
Patient alert and awake, oriented x4, breathing with ease on room air, reports feeling much better, bradycardic HR 40s, patient reports HR at baseline. No new distress reported.

## 2021-05-05 NOTE — ED PROVIDER NOTE - ATTENDING CONTRIBUTION TO CARE
Afebrile. Awake and Alert. Lungs CTA. Heart RRR. Abdomen soft NTND. CN II-XII grossly intact. Moves all extremities without lateralization. No rash. No LE swelling.    r/o ACS: EKG unchanged lateral biphasic T-waves, mild CAD on cath one year ago, will d/w pt's cardiologist  r/o PE: No SOB, however, recent travel

## 2021-05-05 NOTE — ED ADULT TRIAGE NOTE - CHIEF COMPLAINT QUOTE
pt c/o chest pain x2 days. Pt had COVID vaccine March 23 and 10 days after had PE was given blood thinners but ran out of medication Friday and developed chest pain 2 days later. Pt arrived from [place] 5 days ago. Pt received 325mg aspirin and 2 spray nitro with EMS with no relief. Denies SOB, fevers, cough

## 2021-05-05 NOTE — ED PROVIDER NOTE - CARE PROVIDER_API CALL
Juan Daniel Reddy (DO)  Cardiovascular Disease; Internal Medicine; Nuclear Cardiology  [address] [place] [address], [place] [address]  Phone: [phone]  Fax: [phone]  Follow Up Time:

## 2021-05-05 NOTE — ED PROVIDER NOTE - PHYSICAL EXAMINATION
General: Seated in Stretcher, Awake, Alert, Conversant, well appearing  Head, Ears, Eyes, Nose, Throat: Pupils equal, round, reactive to light, normal sclera, moist oral mucosa  Pulmonary: Breath sounds bilaterally, no increased work of breathing, speaking full sentences  Cardiovascular: Normal and regular heart rate, normal S1/S2, no murmurs, rubs, or gallops  Abdominal: Soft and nontender, no rebound or guarding  Extremities: No lower extremity edema or erythema, nontender  Dermatologic: No rash  Neurologic: Alert and oriented x3, clear and fluent speech, moving all extremities with good strength

## 2021-05-05 NOTE — ED PROVIDER NOTE - CLINICAL SUMMARY MEDICAL DECISION MAKING FREE TEXT BOX
59 yo M with cath w/o CAD Jan 2020 seen for 2 days of non exertional positional chest pain in the setting of intermittent pain for past few months. Well appearing. Hemodynamically stable. Bradycardic to 50s. Noted to be bradycardic on admission Jan 2020. EKG grossly similar to prior. Cardiopulmonary exam unremarkable.  Although triage note reports blood clot, pt clarifies that he has never actually been diagnosed with a blood clot.  Will obtain labs, CXR and reassess. No pain meds required at this time.

## 2021-05-05 NOTE — ED PROVIDER NOTE - NS ED ROS FT
Constitutional: No fever or Chills.    Head, Eyes, Ears, Nose, Throat: No visual changes.  No tongue or throat swelling or pain.  Neck: No neck stiffness.  Pulmonary: No shortness of breath or cough.  No wheezing.  Cardiovascular: No palpitations, or diaphoresis.  Abdominal: No abdominal pain. No nausea, vomiting, diarrhea.  No bloody or melenic stools.  Genitourinary: No dysuria or hematuria.    Dermatologic: No rashes.  Neurologic: No headache, weakness, visual changes, speech changes, or sensory abnormalities.  No fainting episodes.

## 2021-05-05 NOTE — ED ADULT TRIAGE NOTE - INTERNATIONAL TRAVEL COUNTRIES
SURVEY:    You may be receiving a survey from [employer] (Inspect Manager) regarding your visit today. Please complete the survey to enable us to provide the highest quality of care to you and your family. If you cannot score us a very good (5 Stars) on any question, please call the office to discuss how we could have made your experience a very good one. Thank you.     Clinical Care Team: RUDOLPH Chin-CHANDANA Nevarez LPN    Clerical Team: Kishore Boone Bladder non-tender and non-distended. [place]

## 2021-10-06 NOTE — DISCHARGE NOTE PROVIDER - NSDCCPCAREPLAN_GEN_ALL_CORE_FT
PRINCIPAL DISCHARGE DIAGNOSIS  Diagnosis: Stable angina  Assessment and Plan of Treatment: You had an angiogram which did not reveal any blockages. You were found to have myocardial bridging, which is a usually  harmless condition in which one mor more of the coronary arteries goes through the heart muscle instead of lying on its surface. Please follow up with your PCP within one week of discharge.      SECONDARY DISCHARGE DIAGNOSES  Diagnosis: Myocardial bridge  Assessment and Plan of Treatment: as above
Family

## 2021-12-16 NOTE — H&P ADULT - RESPIRATORY
The patient's INR today is 3.6 and above goal range of 2.0- 3.0. Patient denies any changes or concerns. Hold Warfarin today, resume current dose tmrw (previously in range@current dose). Recheck in ~10 days. Thank you!     detailed exam

## 2023-07-15 NOTE — ED ADULT NURSE NOTE - NS ED NURSE DISCH DISPOSITION
PROVIDER:[TOKEN:[[id number]],FOLLOWUP:[1 week]],PROVIDER:[TOKEN:[[id number]],FOLLOWUP:[Routine]] Discharged

## 2023-07-25 NOTE — ED PROVIDER NOTE - DATE/TIME 1
-Start Metamucil (Psyllium fiber) daily.  This improves constipation and diarrhea and is beneficial to cardiovascular health.   Take one rounded teaspoon in 8 oz of water or other cool liquid daily.  You can buy this other the counter in any drug store.  Increase it to twice per day if your stool consistency does not improve after five days.  You can increase it further to three times per day if you do not see an improvement after additional five days.    -You can also use Metamucil fiber capsules with 8 oz of water or other cool liquid -Maximum daily dose: 30 g per day up to three times per day      -Side Effect: gas and bloating. This often improves over time.     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 Jarquin for management of your chronic Gi problems             
05-May-2021 10:39

## 2024-01-01 NOTE — ED ADULT TRIAGE NOTE - PATIENT ON (OXYGEN DELIVERY METHOD)
[FreeTextEntry6] : Pt had loose stool wants to check for blood in stool .  
[FreeTextEntry6] : Pt had loose stool wants to check for blood in stool .  
room air